# Patient Record
Sex: FEMALE | Race: OTHER | NOT HISPANIC OR LATINO | URBAN - METROPOLITAN AREA
[De-identification: names, ages, dates, MRNs, and addresses within clinical notes are randomized per-mention and may not be internally consistent; named-entity substitution may affect disease eponyms.]

---

## 2018-12-29 ENCOUNTER — EMERGENCY (EMERGENCY)
Age: 38
LOS: 1 days | Discharge: HOME | End: 2018-12-29
Attending: EMERGENCY MEDICINE | Admitting: EMERGENCY MEDICINE
Payer: COMMERCIAL

## 2018-12-29 VITALS
OXYGEN SATURATION: 100 % | HEART RATE: 93 BPM | DIASTOLIC BLOOD PRESSURE: 72 MMHG | SYSTOLIC BLOOD PRESSURE: 120 MMHG | RESPIRATION RATE: 16 BRPM | TEMPERATURE: 98 F | WEIGHT: 146.94 LBS

## 2018-12-29 PROCEDURE — 99053 MED SERV 10PM-8AM 24 HR FAC: CPT

## 2018-12-29 PROCEDURE — 99283 EMERGENCY DEPT VISIT LOW MDM: CPT | Mod: 25

## 2018-12-29 NOTE — ED PROVIDER NOTE - CARE PLAN
Principal Discharge DX:	Head injury, initial encounter  Secondary Diagnosis:	Contusion of right knee, initial encounter
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
## 2018-12-29 NOTE — ED PROVIDER NOTE - PHYSICAL EXAMINATION
A -airway intact, no stridor or drooling  B- symmetric breath sounds  C- well perfused extremities, no external bleeding  GEN - NAD; well appearing; A+O x3   HEAD - NC/AT     EYES - EOMI, PERRLA  ENT -   mucous membranes  moist , no facial bone tenderness, normal mandibular alignment  NECK - No midline C spine tenderness, FROM neck. mild L paraspinal tenderness  PULM - CTA b/l,  symmetric breath sounds, no chest wall tenderness  COR -  RRR, S1 S2, no murmurs  ABD - , ND, NT, soft, no guarding, no rebound, no masses    BACK - no CVA tenderness, nontender spine, no contusions     EXTREMS - FROM all extremities, no deformity. minimal diffuse tenderness over R knee. no contusion or swelling or laxity.   SKIN - no rash or bruising      NEUROLOGIC - A&Ox3, PERRLA, EOMI, no nystagmus, CN2-12 grossly intact, no pronator drift, normal finger to nose and rapid alternating finger movements, normal sensation and 5/5 strength in all extremities, normal gait, symmetric patellar reflexes
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
## 2018-12-29 NOTE — ED PROVIDER NOTE - NSFOLLOWUPINSTRUCTIONS_ED_ALL_ED_FT
Apply ice to areas that are painful for the next 48 hours. Your symptoms might feel worse tomorrow. Take Tylenol for pain. Return if you develop multiple episodes of vomiting, vision changes, numbness/weakness in your extremities or face, trouble breathing or any other concerning symptom.

## 2018-12-29 NOTE — ED STATDOCS - OBJECTIVE STATEMENT
I performed a medical screening examination and determined this patient to be medically stable and will transfer to the [facility] adult ED for further care. heart and lung exam done and both did not reveal concerns for immediate intervention.